# Patient Record
Sex: FEMALE | Race: WHITE
[De-identification: names, ages, dates, MRNs, and addresses within clinical notes are randomized per-mention and may not be internally consistent; named-entity substitution may affect disease eponyms.]

---

## 2023-05-06 ENCOUNTER — HOSPITAL ENCOUNTER (EMERGENCY)
Dept: HOSPITAL 33 - ED | Age: 28
Discharge: HOME | End: 2023-05-06
Payer: COMMERCIAL

## 2023-05-06 VITALS — SYSTOLIC BLOOD PRESSURE: 134 MMHG | DIASTOLIC BLOOD PRESSURE: 80 MMHG | HEART RATE: 84 BPM

## 2023-05-06 VITALS — OXYGEN SATURATION: 99 %

## 2023-05-06 DIAGNOSIS — Z72.0: ICD-10-CM

## 2023-05-06 DIAGNOSIS — R00.2: ICD-10-CM

## 2023-05-06 DIAGNOSIS — R07.9: Primary | ICD-10-CM

## 2023-05-06 DIAGNOSIS — Z28.310: ICD-10-CM

## 2023-05-06 LAB
ALBUMIN SERPL-MCNC: 4.2 G/DL (ref 3.5–5)
ALP SERPL-CCNC: 97 U/L (ref 38–126)
ALT SERPL-CCNC: 21 U/L (ref 0–35)
ANION GAP SERPL CALC-SCNC: 16.2 MEQ/L (ref 5–15)
AST SERPL QL: 22 U/L (ref 14–36)
BASOPHILS # BLD AUTO: 0.06 X10^3/UL (ref 0–0.4)
BASOPHILS NFR BLD AUTO: 0.5 % (ref 0–0.4)
BILIRUB BLD-MCNC: 0.4 MG/DL (ref 0.2–1.3)
BUN SERPL-MCNC: 14 MG/DL (ref 7–17)
CALCIUM SPEC-MCNC: 8.8 MG/DL (ref 8.4–10.2)
CHLORIDE SERPL-SCNC: 106 MMOL/L (ref 98–107)
CO2 SERPL-SCNC: 21 MMOL/L (ref 22–30)
CREAT SERPL-MCNC: 0.53 MG/DL (ref 0.52–1.04)
EOSINOPHIL # BLD AUTO: 0.52 X10^3/UL (ref 0–0.5)
GFR SERPLBLD BASED ON 1.73 SQ M-ARVRAT: > 60 ML/MIN
GLUCOSE SERPL-MCNC: 112 MG/DL (ref 74–106)
HCT VFR BLD AUTO: 37.8 % (ref 35–47)
HGB BLD-MCNC: 12.6 G/DL (ref 12–16)
IMM GRANULOCYTES # BLD: 0.03 X10^3U/L (ref 0–0.03)
IMM GRANULOCYTES NFR BLD: 0.3 % (ref 0–0.4)
LYMPHOCYTES # SPEC AUTO: 4.48 X10^3/UL (ref 1–4.6)
MCH RBC QN AUTO: 30.5 PG (ref 26–32)
MCHC RBC AUTO-ENTMCNC: 33.3 G/DL (ref 32–36)
MONOCYTES # BLD AUTO: 0.95 X10^3/UL (ref 0–1.3)
NRBC # BLD AUTO: 0 X10^3U/L (ref 0–0.01)
NRBC BLD AUTO-RTO: 0 % (ref 0–0.1)
PLATELET # BLD AUTO: 243 X10^3/UL (ref 150–450)
POTASSIUM SERPLBLD-SCNC: 3.9 MMOL/L (ref 3.5–5.1)
PROT SERPL-MCNC: 7.7 G/DL (ref 6.3–8.2)
RBC # BLD AUTO: 4.13 X10^6/UL (ref 4.1–5.4)
SODIUM SERPL-SCNC: 139 MMOL/L (ref 137–145)
T4 SERPL-MCNC: 9.32 UG/DL (ref 5.53–10.96)
WBC # BLD AUTO: 11.1 X10^3/UL (ref 4–10.5)

## 2023-05-06 PROCEDURE — 80053 COMPREHEN METABOLIC PANEL: CPT

## 2023-05-06 PROCEDURE — 84436 ASSAY OF TOTAL THYROXINE: CPT

## 2023-05-06 PROCEDURE — 36415 COLL VENOUS BLD VENIPUNCTURE: CPT

## 2023-05-06 PROCEDURE — 85025 COMPLETE CBC W/AUTO DIFF WBC: CPT

## 2023-05-06 PROCEDURE — 99284 EMERGENCY DEPT VISIT MOD MDM: CPT

## 2023-05-06 PROCEDURE — 84484 ASSAY OF TROPONIN QUANT: CPT

## 2023-05-06 PROCEDURE — 93225 XTRNL ECG REC<48 HRS REC: CPT

## 2023-05-06 PROCEDURE — 36000 PLACE NEEDLE IN VEIN: CPT

## 2023-05-06 PROCEDURE — 93041 RHYTHM ECG TRACING: CPT

## 2023-05-06 PROCEDURE — 94760 N-INVAS EAR/PLS OXIMETRY 1: CPT

## 2023-05-06 PROCEDURE — 96360 HYDRATION IV INFUSION INIT: CPT

## 2023-05-06 PROCEDURE — 71045 X-RAY EXAM CHEST 1 VIEW: CPT

## 2023-05-06 PROCEDURE — 84443 ASSAY THYROID STIM HORMONE: CPT

## 2023-05-06 PROCEDURE — 93005 ELECTROCARDIOGRAM TRACING: CPT

## 2023-05-06 NOTE — ERPHSYRPT
- History of Present Illness


Time Seen by Provider: 05/06/23 04:53


Historian: patient


Exam Limitations: no limitations


Patient Subjective Stated Complaint: PT STATES SHE HAD AN EPISODE OF CHEST PAIN 

YESTERDAY AROUND NOON THAT WENT AWAY AFTER A NAP. STATES SHE WOKE UP AT 0330 

THIS MORNING WITH PALPITATIONS, FEELING LIKE HER HEART WAS RACING, AND CHEST 

TIGHTNESS.


Triage Nursing Assessment: PT ALERT AND ORIENTED, ANSWERS QUESTIONS APPROP. PT 

AMBULATES INTO ROOM WITH STEADY GAIT NOTED. RESPIRATIONS NONLABORED. SKIN WARM 

AND DRY. HEART RATE 88, SINUS RHYTHM ON MONITOR.


Physician History: 


Pt c/o chest pain; a few episodes throughout today.


Pain located on center.


No radiation.


Described as sharp, "jolt"


Lasts for a few minutes.


At rest. Not related to exertion.  


Worse laying down


+ smoker


Endorses palpitations, cold sweats. Denies SOB, heartburn or emotional 

stressors. 


Timing/Duration: today


Activities at Onset: rest


Quality: sharpness


Location: central


Chest Pain Radiation: no radiation


Severity of Pain-Max: severe


Severity of Pain-Current: moderate


Modifying Factors: Worsens With: lying down, movement


Associated Symptoms: palpitations, No nausea, No vomiting, No abdominal pain, No

shortness of breath, No chills, No fever, No edema


Prior Chest Pain/Cardiac Workup: no prior chest pain


Nitro Today/Relief: 0.4 mg x 1, provided by ED


Aspirin Treatment Today: 81 mg x 4, provided by ED


Allergies/Adverse Reactions: 








amoxicillin Allergy (Intermediate, Verified 05/06/23 04:43)


   Hives


Penicillins Allergy (Intermediate, Verified 05/06/23 04:43)


   Hives





Home Medications: 








No Reportable Medications [No Reported Medications]  05/06/23 [History]





Hx Tetanus, Diphtheria Vaccination/Date Given: Yes


Hx Influenza Vaccination/Date Given: No


Hx Pneumococcal Vaccination/Date Given: No





Travel Risk





- International Travel


Have you traveled outside of the country in past 3 weeks: No





- Coronavirus Screening


Are you exhibiting any of the following symptoms?: No


Close contact with a COVID-19 positive Pt in past 14-21 Days: No





- Vaccine Status


Have you recieved a Covid-19 vaccination: No





- Review of Systems


Constitutional: No Symptoms


Eyes: No Symptoms


Ears, Nose, & Throat: No Symptoms


Respiratory: No Symptoms


Cardiac: Chest Pain, Palpitations, No Edema, No Syncope, No Orthopnea, No PND


Abdominal/Gastrointestinal: No Symptoms


Genitourinary Symptoms: No Symptoms


Musculoskeletal: No Symptoms


Skin: No Symptoms


Neurological: No Symptoms


Psychological: Anxiety, Depression


Endocrine: No Symptoms


Hematologic/Lymphatic: No Symptoms


Immunological/Allergic: No Symptoms


All Other Systems: Reviewed and Negative





- Past Medical History


Pertinent Past Medical History: Yes


Psycho-Social History: Anxiety, Depression





- Past Surgical History


Past Surgical History: Yes


Other Surgical History: TONGUE SURGERY- 1994.  I&D LT LOWER LEG





- Social History


Smoking Status: Current every day smoker


How long have you smoked: 10 YRS


Drug Use: none


Patient Lives Alone: No





- Female History


Hx Last Menstrual Period: TODAY


Hx Pregnant Now: No





- Nursing Vital Signs


Nursing Vital Signs: 


                               Initial Vital Signs











Temperature  97.8 F   05/06/23 04:28


 


Pulse Rate  90   05/06/23 04:28


 


Respiratory Rate  16   05/06/23 04:28


 


Blood Pressure  156/89   05/06/23 04:28


 


O2 Sat by Pulse Oximetry  99   05/06/23 04:28








                                   Pain Scale











Pain Intensity                 4

















- Physical Exam


General Appearance: no apparent distress, anxiety


Eye Exam: eyes nml inspection


Ears, Nose, Throat Exam: normal ENT inspection


Neck Exam: normal inspection, full range of motion


Respiratory Exam: airway intact, No respiratory distress


Cardiovascular Exam: regular rate/rhythm, capillary refill <2 sec, No edema


Gastrointestinal/Abdomen Exam: soft, No tenderness, No distention, No guarding, 

No rebound


Extremity Exam: normal inspection, normal range of motion, No swelling, No 

tenderness


Neurologic Exam: alert, oriented x 3, cooperative, sensation nml


Skin Exam: normal color, warm, dry, No rash


**SpO2 Interpretation**: normal


SpO2: 99


O2 Delivery: Room Air





- Course


Nursing assessment & vital signs reviewed: Yes


EKG Interpreted by Me: RATE (99), Sinus Rhythm, NORMAL AXIS, NORMAL INTERVALS, 

NORMAL QRS, NORMAL ST-T





- Radiology Exams


  ** Chest


X-ray Interpretation: Interpreted by me, Negative


Ordered Tests: 


Medication Summary














Discontinued Medications














Generic Name Dose Route Start Last Admin





  Trade Name Freq  PRN Reason Stop Dose Admin


 


Aspirin  324 mg  05/06/23 04:54  05/06/23 05:22





  Aspirin 81 Mg Tab.Chew  PO  05/06/23 04:55  324 mg





  STAT ONE   Administration


 


Sodium Chloride  1,000 mls @ 999 mls/hr  05/06/23 04:54  05/06/23 06:33





  Sodium Chloride 0.9% 1000 Ml  IV  05/06/23 05:54  Infused





  .Q1H1M STA   Infusion


 


Sodium Chloride  Confirm  05/06/23 05:20 





  Sodium Chloride 0.9% 1000 Ml  Administered  05/06/23 05:21 





  Dose  





  1,000 mls @ ud  





  .ROUTE  





  .STK-MED ONE  


 


Nitroglycerin  0.4 mg  05/06/23 04:54  05/06/23 05:22





  Nitroglycerin 0.4 Mg (Ed) 0.4 Mg Tab.Subl  SL  05/06/23 04:55  0.4 mg





  STAT ONE   Administration











Lab/Rad Data: 


                           Laboratory Result Diagrams





                                 05/06/23 04:54 





                                 05/06/23 04:54 





                               Laboratory Results











  05/06/23 05/06/23 05/06/23 Range/Units





  05:12 05:00 04:54 


 


WBC     (4.0-10.5)  x10^3/uL


 


RBC     (4.1-5.4)  x10^6/uL


 


Hgb     (12.0-16.0)  g/dL


 


Hct     (35-47)  %


 


MCV     ()  fL


 


MCH     (26-32)  pg


 


MCHC     (32-36)  g/dL


 


RDW     (11.5-14.0)  %


 


Plt Count     (150-450)  x10^3/uL


 


MPV     (7.5-11.0)  fL


 


Gran %     (36.0-66.0)  %


 


Immature Gran % (Auto)     (0.00-0.4)  %


 


Nucleat RBC Rel Count     (0.00-0.1)  %


 


Eos # (Auto)     (0-0.5)  x10^3/uL


 


Immature Gran # (Auto)     (0.00-0.03)  x10^3u/L


 


Absolute Lymphs (auto)     (1.0-4.6)  x10^3/uL


 


Absolute Monos (auto)     (0.0-1.3)  x10^3/uL


 


Absolute Nucleated RBC     (0.00-0.01)  x10^3u/L


 


Lymphocytes %     (24.0-44.0)  %


 


Monocytes %     (0.0-12.0)  %


 


Eosinophils %     (0.00-5.0)  %


 


Basophils %     (0.0-0.4)  %


 


Absolute Granulocytes     (1.4-6.9)  x10^3/uL


 


Basophils #     (0-0.4)  x10^3/uL


 


Sodium    139  (137-145)  mmol/L


 


Potassium    3.9  (3.5-5.1)  mmol/L


 


Chloride    106  ()  mmol/L


 


Carbon Dioxide    21 L  (22-30)  mmol/L


 


Anion Gap    16.2 H  (5-15)  MEQ/L


 


BUN    14  (7-17)  mg/dL


 


Creatinine    0.53  (0.52-1.04)  mg/dL


 


Estimated GFR    > 60.0  ML/MIN


 


Glucose    112 H  ()  mg/dL


 


Calcium    8.8  (8.4-10.2)  mg/dL


 


Total Bilirubin    0.40  (0.2-1.3)  mg/dL


 


AST    22  (14-36)  U/L


 


ALT    21  (0-35)  U/L


 


Alkaline Phosphatase    97  ()  U/L


 


Troponin I   < 0.012   (0.000-0.034)  ng/mL


 


Serum Total Protein    7.7  (6.3-8.2)  g/dL


 


Albumin    4.2  (3.5-5.0)  g/dL


 


Thyroxine (T4)  9.32    (5.53-10.96)  ug/dL


 


TSH 3rd Generation  3.360    (0.47-4.68)  mIU/L














  05/06/23 Range/Units





  04:54 


 


WBC  11.1 H  (4.0-10.5)  x10^3/uL


 


RBC  4.13  (4.1-5.4)  x10^6/uL


 


Hgb  12.6  (12.0-16.0)  g/dL


 


Hct  37.8  (35-47)  %


 


MCV  91.5  ()  fL


 


MCH  30.5  (26-32)  pg


 


MCHC  33.3  (32-36)  g/dL


 


RDW  13.3  (11.5-14.0)  %


 


Plt Count  243  (150-450)  x10^3/uL


 


MPV  9.6  (7.5-11.0)  fL


 


Gran %  45.5  (36.0-66.0)  %


 


Immature Gran % (Auto)  0.3  (0.00-0.4)  %


 


Nucleat RBC Rel Count  0.0  (0.00-0.1)  %


 


Eos # (Auto)  0.52 H  (0-0.5)  x10^3/uL


 


Immature Gran # (Auto)  0.03  (0.00-0.03)  x10^3u/L


 


Absolute Lymphs (auto)  4.48  (1.0-4.6)  x10^3/uL


 


Absolute Monos (auto)  0.95  (0.0-1.3)  x10^3/uL


 


Absolute Nucleated RBC  0.00  (0.00-0.01)  x10^3u/L


 


Lymphocytes %  40.4  (24.0-44.0)  %


 


Monocytes %  8.6  (0.0-12.0)  %


 


Eosinophils %  4.7  (0.00-5.0)  %


 


Basophils %  0.5  (0.0-0.4)  %


 


Absolute Granulocytes  5.05  (1.4-6.9)  x10^3/uL


 


Basophils #  0.06  (0-0.4)  x10^3/uL


 


Sodium   (137-145)  mmol/L


 


Potassium   (3.5-5.1)  mmol/L


 


Chloride   ()  mmol/L


 


Carbon Dioxide   (22-30)  mmol/L


 


Anion Gap   (5-15)  MEQ/L


 


BUN   (7-17)  mg/dL


 


Creatinine   (0.52-1.04)  mg/dL


 


Estimated GFR   ML/MIN


 


Glucose   ()  mg/dL


 


Calcium   (8.4-10.2)  mg/dL


 


Total Bilirubin   (0.2-1.3)  mg/dL


 


AST   (14-36)  U/L


 


ALT   (0-35)  U/L


 


Alkaline Phosphatase   ()  U/L


 


Troponin I   (0.000-0.034)  ng/mL


 


Serum Total Protein   (6.3-8.2)  g/dL


 


Albumin   (3.5-5.0)  g/dL


 


Thyroxine (T4)   (5.53-10.96)  ug/dL


 


TSH 3rd Generation   (0.47-4.68)  mIU/L














- Progress


Progress: unchanged


Air Movement: good


Progress Note: 


CBC, CMP, TSH wnl, Troponin neg. EKG NSR. CXR neg. Will send patient home w/ 48 

hour holter monitor and encourage to f/u w/ Dr. Tapia. 


Blood Culture(s) Obtained: No


Antibiotics given: No


Counseled pt/family regarding: lab results, need for follow-up, rad results





Medical Desision Making





- Diagnostic Testing


Diagnostic test were ordered, analyzed, and reviewed by me: Yes


Radiological Interpretation: Interpreted by me





- Risk of complications


Low Risk: Low risk of morbidity from additional dx testing or treatment





- Departure


Departure Disposition: Home


Clinical Impression: 


 Palpitations, Chest pain





Condition: Good


Critical Care Time: No


Referrals: 


BOLA TAPIA [COURTESY STAFF] - Follow up/PCP as directed


Instructions:  Palpitations (DC)


Additional Instructions: 


Send home w/ 48 hour holter monitor. F/U w/ Dr. Tapia w/in 3-5 days.